# Patient Record
Sex: MALE | Race: WHITE | NOT HISPANIC OR LATINO
[De-identification: names, ages, dates, MRNs, and addresses within clinical notes are randomized per-mention and may not be internally consistent; named-entity substitution may affect disease eponyms.]

---

## 2018-12-26 ENCOUNTER — RECORD ABSTRACTING (OUTPATIENT)
Age: 53
End: 2018-12-26

## 2018-12-26 DIAGNOSIS — Z82.49 FAMILY HISTORY OF ISCHEMIC HEART DISEASE AND OTHER DISEASES OF THE CIRCULATORY SYSTEM: ICD-10-CM

## 2018-12-26 DIAGNOSIS — Z83.49 FAMILY HISTORY OF OTHER ENDOCRINE, NUTRITIONAL AND METABOLIC DISEASES: ICD-10-CM

## 2018-12-26 DIAGNOSIS — Z78.9 OTHER SPECIFIED HEALTH STATUS: ICD-10-CM

## 2019-01-04 ENCOUNTER — APPOINTMENT (OUTPATIENT)
Dept: INTERNAL MEDICINE | Facility: CLINIC | Age: 54
End: 2019-01-04
Payer: COMMERCIAL

## 2019-01-04 ENCOUNTER — NON-APPOINTMENT (OUTPATIENT)
Age: 54
End: 2019-01-04

## 2019-01-04 VITALS
HEIGHT: 73 IN | BODY MASS INDEX: 34.46 KG/M2 | WEIGHT: 260 LBS | SYSTOLIC BLOOD PRESSURE: 132 MMHG | DIASTOLIC BLOOD PRESSURE: 72 MMHG

## 2019-01-04 DIAGNOSIS — M15.3 SECONDARY MULTIPLE ARTHRITIS: ICD-10-CM

## 2019-01-04 DIAGNOSIS — E66.09 OTHER OBESITY DUE TO EXCESS CALORIES: ICD-10-CM

## 2019-01-04 LAB
APPEARANCE: CLEAR
BASOPHILS # BLD AUTO: 0.02 K/UL
BASOPHILS NFR BLD AUTO: 0.3 %
BILIRUBIN URINE: NEGATIVE
BLOOD URINE: NEGATIVE
COLOR: ABNORMAL
EOSINOPHIL # BLD AUTO: 0.09 K/UL
EOSINOPHIL NFR BLD AUTO: 1.4 %
GLUCOSE QUALITATIVE U: NEGATIVE MG/DL
HCT VFR BLD CALC: 46.8 %
HGB BLD-MCNC: 14.8 G/DL
IMM GRANULOCYTES NFR BLD AUTO: 0 %
KETONES URINE: NEGATIVE
LEUKOCYTE ESTERASE URINE: NEGATIVE
LYMPHOCYTES # BLD AUTO: 1.94 K/UL
LYMPHOCYTES NFR BLD AUTO: 30.4 %
MAN DIFF?: NORMAL
MCHC RBC-ENTMCNC: 29.4 PG
MCHC RBC-ENTMCNC: 31.6 GM/DL
MCV RBC AUTO: 93 FL
MONOCYTES # BLD AUTO: 0.59 K/UL
MONOCYTES NFR BLD AUTO: 9.2 %
NEUTROPHILS # BLD AUTO: 3.75 K/UL
NEUTROPHILS NFR BLD AUTO: 58.7 %
NITRITE URINE: NEGATIVE
PH URINE: 6.5
PLATELET # BLD AUTO: 180 K/UL
PROTEIN URINE: NEGATIVE MG/DL
RBC # BLD: 5.03 M/UL
RBC # FLD: 13.7 %
SPECIFIC GRAVITY URINE: 1.03
UROBILINOGEN URINE: NEGATIVE MG/DL
WBC # FLD AUTO: 6.39 K/UL

## 2019-01-04 PROCEDURE — 99396 PREV VISIT EST AGE 40-64: CPT | Mod: 25

## 2019-01-04 PROCEDURE — 93000 ELECTROCARDIOGRAM COMPLETE: CPT

## 2019-01-04 NOTE — HISTORY OF PRESENT ILLNESS
[FreeTextEntry8] : Patient is a 53-year-old male presenting for an annual exam. He's gained significant weight over the last few months is very concerned. He is very athletic, but has not been as active as he has been in the past. He says well being is otherwise good. He is eating well sleeping well. No night sweats. No chills. No fever. He has a number of orthopedic issues related to bilateral fractured wrists in the past for dislocated shoulder in the past and significant cervical discogenic disease. All of which are under relatively good control. Using Tylenol anti-inflammatories. He is more comfortable. Symptomatology was discussed in relationship to the necessity of surgery, particularly in relationship to his cervical disc.

## 2019-01-05 LAB
ALBUMIN SERPL ELPH-MCNC: 4.7 G/DL
ALP BLD-CCNC: 56 U/L
ALT SERPL-CCNC: 71 U/L
ANION GAP SERPL CALC-SCNC: 13 MMOL/L
AST SERPL-CCNC: 34 U/L
BILIRUB SERPL-MCNC: 0.7 MG/DL
BUN SERPL-MCNC: 19 MG/DL
CALCIUM SERPL-MCNC: 9.3 MG/DL
CHLORIDE SERPL-SCNC: 102 MMOL/L
CHOLEST SERPL-MCNC: 167 MG/DL
CHOLEST/HDLC SERPL: 3.3 RATIO
CO2 SERPL-SCNC: 24 MMOL/L
CREAT SERPL-MCNC: 0.91 MG/DL
GLUCOSE SERPL-MCNC: 104 MG/DL
HDLC SERPL-MCNC: 51 MG/DL
LDLC SERPL CALC-MCNC: 99 MG/DL
POTASSIUM SERPL-SCNC: 4.3 MMOL/L
PROT SERPL-MCNC: 7.3 G/DL
PSA FREE FLD-MCNC: 32 %
PSA FREE SERPL-MCNC: 0.36 NG/ML
PSA SERPL-MCNC: 1.11 NG/ML
SODIUM SERPL-SCNC: 139 MMOL/L
T4 FREE SERPL-MCNC: 1.2 NG/DL
TRIGL SERPL-MCNC: 87 MG/DL
TSH SERPL-ACNC: 2.25 UIU/ML

## 2019-01-11 ENCOUNTER — APPOINTMENT (OUTPATIENT)
Dept: UROLOGY | Facility: CLINIC | Age: 54
End: 2019-01-11

## 2019-02-11 ENCOUNTER — TRANSCRIPTION ENCOUNTER (OUTPATIENT)
Age: 54
End: 2019-02-11

## 2019-03-29 ENCOUNTER — APPOINTMENT (OUTPATIENT)
Dept: UROLOGY | Facility: CLINIC | Age: 54
End: 2019-03-29

## 2019-08-02 ENCOUNTER — APPOINTMENT (OUTPATIENT)
Dept: UROLOGY | Facility: CLINIC | Age: 54
End: 2019-08-02

## 2019-11-09 ENCOUNTER — TRANSCRIPTION ENCOUNTER (OUTPATIENT)
Age: 54
End: 2019-11-09

## 2020-12-21 ENCOUNTER — RESULT REVIEW (OUTPATIENT)
Age: 55
End: 2020-12-21

## 2020-12-21 ENCOUNTER — APPOINTMENT (OUTPATIENT)
Dept: FAMILY MEDICINE | Facility: CLINIC | Age: 55
End: 2020-12-21
Payer: COMMERCIAL

## 2020-12-21 ENCOUNTER — NON-APPOINTMENT (OUTPATIENT)
Age: 55
End: 2020-12-21

## 2020-12-21 VITALS
DIASTOLIC BLOOD PRESSURE: 82 MMHG | RESPIRATION RATE: 16 BRPM | HEART RATE: 80 BPM | SYSTOLIC BLOOD PRESSURE: 120 MMHG | OXYGEN SATURATION: 97 % | BODY MASS INDEX: 36.58 KG/M2 | HEIGHT: 73 IN | WEIGHT: 276 LBS | TEMPERATURE: 98.2 F

## 2020-12-21 PROCEDURE — 99072 ADDL SUPL MATRL&STAF TM PHE: CPT

## 2020-12-21 PROCEDURE — 90686 IIV4 VACC NO PRSV 0.5 ML IM: CPT

## 2020-12-21 PROCEDURE — 93000 ELECTROCARDIOGRAM COMPLETE: CPT

## 2020-12-21 PROCEDURE — 99215 OFFICE O/P EST HI 40 MIN: CPT | Mod: 25

## 2020-12-21 PROCEDURE — 36415 COLL VENOUS BLD VENIPUNCTURE: CPT

## 2020-12-21 PROCEDURE — G0008: CPT

## 2020-12-21 RX ORDER — IBUPROFEN 200 MG/1
200 TABLET, COATED ORAL 3 TIMES DAILY
Refills: 0 | Status: ACTIVE | COMMUNITY

## 2020-12-22 ENCOUNTER — NON-APPOINTMENT (OUTPATIENT)
Age: 55
End: 2020-12-22

## 2020-12-22 LAB
APTT BLD: 33.8 SEC
BASOPHILS # BLD AUTO: 0.03 K/UL
BASOPHILS NFR BLD AUTO: 0.6 %
EOSINOPHIL # BLD AUTO: 0.08 K/UL
EOSINOPHIL NFR BLD AUTO: 1.5 %
ESTIMATED AVERAGE GLUCOSE: 117 MG/DL
HBA1C MFR BLD HPLC: 5.7 %
HCT VFR BLD CALC: 46.9 %
HGB BLD-MCNC: 14.9 G/DL
IMM GRANULOCYTES NFR BLD AUTO: 0.2 %
INR PPP: 0.87 RATIO
LYMPHOCYTES # BLD AUTO: 1.79 K/UL
LYMPHOCYTES NFR BLD AUTO: 33.1 %
MAN DIFF?: NORMAL
MCHC RBC-ENTMCNC: 30.4 PG
MCHC RBC-ENTMCNC: 31.8 GM/DL
MCV RBC AUTO: 95.7 FL
MONOCYTES # BLD AUTO: 0.45 K/UL
MONOCYTES NFR BLD AUTO: 8.3 %
NEUTROPHILS # BLD AUTO: 3.05 K/UL
NEUTROPHILS NFR BLD AUTO: 56.3 %
PLATELET # BLD AUTO: 173 K/UL
PT BLD: 10.4 SEC
RBC # BLD: 4.9 M/UL
RBC # FLD: 12.9 %
WBC # FLD AUTO: 5.41 K/UL

## 2020-12-24 LAB
ALBUMIN SERPL ELPH-MCNC: 4.5 G/DL
ALP BLD-CCNC: 70 U/L
ALT SERPL-CCNC: 43 U/L
ANION GAP SERPL CALC-SCNC: 12 MMOL/L
AST SERPL-CCNC: 24 U/L
BILIRUB SERPL-MCNC: 0.5 MG/DL
BUN SERPL-MCNC: 21 MG/DL
CALCIUM SERPL-MCNC: 9.5 MG/DL
CHLORIDE SERPL-SCNC: 105 MMOL/L
CO2 SERPL-SCNC: 24 MMOL/L
CREAT SERPL-MCNC: 1.15 MG/DL
GLUCOSE SERPL-MCNC: 148 MG/DL
POTASSIUM SERPL-SCNC: 4.6 MMOL/L
PROT SERPL-MCNC: 7 G/DL
PSA FREE FLD-MCNC: 48 %
PSA FREE SERPL-MCNC: 0.28 NG/ML
PSA SERPL-MCNC: 0.57 NG/ML
SODIUM SERPL-SCNC: 141 MMOL/L

## 2020-12-24 NOTE — PHYSICAL EXAM
[Pedal Pulses Present] : the pedal pulses are present [No Edema] : there was no peripheral edema [No Extremity Clubbing/Cyanosis] : no extremity clubbing/cyanosis [Normal Anterior Cervical Nodes] : no anterior cervical lymphadenopathy [No CVA Tenderness] : no CVA  tenderness [No Joint Swelling] : no joint swelling [Grossly Normal Strength/Tone] : grossly normal strength/tone [Normal] : affect was normal and insight and judgment were intact [de-identified] : left knee: FROM intact

## 2020-12-24 NOTE — HISTORY OF PRESENT ILLNESS
[No Pertinent Cardiac History] : no history of aortic stenosis, atrial fibrillation, coronary artery disease, recent myocardial infarction, or implantable device/pacemaker [No Pertinent Pulmonary History] : no history of asthma, COPD, sleep apnea, or smoking [No Adverse Anesthesia Reaction] : no adverse anesthesia reaction in self or family member [Excellent (>10 METs)] : Excellent (>10 METs) [FreeTextEntry1] : left knee arthroscopy [FreeTextEntry2] : 12/29/20 [FreeTextEntry3] : Dr. Wilkerson [FreeTextEntry4] : 54 yo M here for pre-operative exam prior to knee surgery. Patient is due to have a left knee arthroscopy given his hx of a partially torn left lateral and medial meniscus. Patient has a hx of multiple knee surgeries with bilateral ACL repairs all due to hx of motorcross sport. He currently is working in a warehouse and is able to climb multiple flights of stairs and lift  boxes and also participates in [employer] once a week as a sport. He denies any hx of anesthesia complications or bleeding complications. Patient reports he was under general anesthesia for last knee scope. Patient does not smoke cigarerttes. Patient additionally requesting PSA and Hgb A1c testing as well as the flu shot today. Will follow with Dr. Holcomb for colonoscopy and routine health maintenance.  [FreeTextEntry8] : Patient performs vigorous motorcross sport activities

## 2021-04-23 ENCOUNTER — APPOINTMENT (OUTPATIENT)
Dept: FAMILY MEDICINE | Facility: CLINIC | Age: 56
End: 2021-04-23
Payer: COMMERCIAL

## 2021-04-23 VITALS
DIASTOLIC BLOOD PRESSURE: 88 MMHG | HEART RATE: 88 BPM | RESPIRATION RATE: 16 BRPM | TEMPERATURE: 98 F | BODY MASS INDEX: 35.78 KG/M2 | OXYGEN SATURATION: 99 % | SYSTOLIC BLOOD PRESSURE: 128 MMHG | WEIGHT: 270 LBS | HEIGHT: 73 IN

## 2021-04-23 DIAGNOSIS — S39.012A STRAIN OF MUSCLE, FASCIA AND TENDON OF LOWER BACK, INITIAL ENCOUNTER: ICD-10-CM

## 2021-04-23 DIAGNOSIS — Z01.818 ENCOUNTER FOR OTHER PREPROCEDURAL EXAMINATION: ICD-10-CM

## 2021-04-23 DIAGNOSIS — Z23 ENCOUNTER FOR IMMUNIZATION: ICD-10-CM

## 2021-04-23 LAB — HBA1C MFR BLD HPLC: 5.7

## 2021-04-23 PROCEDURE — 99213 OFFICE O/P EST LOW 20 MIN: CPT | Mod: 25

## 2021-04-23 PROCEDURE — 83036 HEMOGLOBIN GLYCOSYLATED A1C: CPT | Mod: NC,QW

## 2021-04-23 PROCEDURE — 99072 ADDL SUPL MATRL&STAF TM PHE: CPT

## 2021-04-26 PROBLEM — S39.012A LOW BACK STRAIN: Status: ACTIVE | Noted: 2021-04-26

## 2021-04-26 RX ORDER — OXYCODONE AND ACETAMINOPHEN 5; 325 MG/1; MG/1
5-325 TABLET ORAL
Qty: 24 | Refills: 0 | Status: COMPLETED | COMMUNITY
Start: 2020-12-29

## 2021-04-26 NOTE — PHYSICAL EXAM
[EOMI] : extraocular movements intact [No CVA Tenderness] : no CVA  tenderness [Coordination Grossly Intact] : coordination grossly intact [Normal Gait] : normal gait [Deep Tendon Reflexes (DTR)] : deep tendon reflexes were 2+ and symmetric [Normal] : affect was normal and insight and judgment were intact [de-identified] : mild parapsinal tenderness, right sided, no spasm present, no spinal point tenderness, negative straight leg raise bilaterally

## 2021-04-26 NOTE — PLAN
[FreeTextEntry1] : Patient wanted to follow up and monitor stable prediabetes; Hgb A1c reflective of stable prediabetes.

## 2021-04-26 NOTE — HISTORY OF PRESENT ILLNESS
[FreeTextEntry8] : 55 yo M with hx of prediabetes presenting for acute visit for lower back pain x2 weeks. Patient reports no other acute complaints but has been recently worried since coworker had back pain and ended up with metastases from cancer. Patient is currently working in [employer] where he is often lifting heavy boxes by himself up to 100lbs. Patient does not wear a back brace. Denies any changes in urination, changes in bowel movements, saddle anesthesia, sciatica type pain, nausea/vomiting, recent weight loss, nightsweats or chills.

## 2022-07-21 ENCOUNTER — TRANSCRIPTION ENCOUNTER (OUTPATIENT)
Age: 57
End: 2022-07-21

## 2022-08-19 ENCOUNTER — APPOINTMENT (OUTPATIENT)
Dept: FAMILY MEDICINE | Facility: CLINIC | Age: 57
End: 2022-08-19

## 2022-08-19 VITALS
SYSTOLIC BLOOD PRESSURE: 124 MMHG | DIASTOLIC BLOOD PRESSURE: 80 MMHG | BODY MASS INDEX: 36.18 KG/M2 | HEIGHT: 73 IN | OXYGEN SATURATION: 97 % | RESPIRATION RATE: 16 BRPM | WEIGHT: 273 LBS | TEMPERATURE: 97.4 F | HEART RATE: 72 BPM

## 2022-08-19 DIAGNOSIS — A69.20 LYME DISEASE, UNSPECIFIED: ICD-10-CM

## 2022-08-19 DIAGNOSIS — Z23 ENCOUNTER FOR IMMUNIZATION: ICD-10-CM

## 2022-08-19 PROCEDURE — 99213 OFFICE O/P EST LOW 20 MIN: CPT | Mod: 25

## 2022-08-19 PROCEDURE — 90471 IMMUNIZATION ADMIN: CPT

## 2022-08-19 PROCEDURE — 90750 HZV VACC RECOMBINANT IM: CPT

## 2022-08-26 NOTE — ADDENDUM
[FreeTextEntry1] : Patient received first shingles (Shingrix) vaccination during physician visit as ordered by Dr. Tejada.\par Prior to administration, reviewed shingles VIS with patient who verbalized understanding and consent.\par Patient denied severe allergic reaction to prior vaccinations and denied experiencing current episode of shingles.\par \par Patient tolerated vaccination well in right deltoid. No immediate adverse reaction noted.\par Patient provided shingles VIS for home review as per protocol. Patient stated that he will be scheduling annual physical with Dr. Tejada in 10 weeks and will received second shingles vaccination at physical.\par \par Leeann Sanchez RN

## 2022-08-26 NOTE — HISTORY OF PRESENT ILLNESS
[FreeTextEntry1] : ED follow up [de-identified] : Patient is a 56 yo M here for a four week follow up visit from ED visit for rash/Lyme disease. Tested positive in ED, completed treatment. feeling well. Also needs shingles shot.

## 2022-11-04 ENCOUNTER — APPOINTMENT (OUTPATIENT)
Dept: FAMILY MEDICINE | Facility: CLINIC | Age: 57
End: 2022-11-04

## 2022-11-04 VITALS
BODY MASS INDEX: 36.58 KG/M2 | DIASTOLIC BLOOD PRESSURE: 86 MMHG | WEIGHT: 276 LBS | HEIGHT: 73 IN | SYSTOLIC BLOOD PRESSURE: 136 MMHG | OXYGEN SATURATION: 97 % | TEMPERATURE: 97.2 F | HEART RATE: 73 BPM

## 2022-11-04 DIAGNOSIS — R73.03 PREDIABETES.: ICD-10-CM

## 2022-11-04 DIAGNOSIS — Z00.00 ENCOUNTER FOR GENERAL ADULT MEDICAL EXAMINATION W/OUT ABNORMAL FINDINGS: ICD-10-CM

## 2022-11-04 DIAGNOSIS — M50.90 CERVICAL DISC DISORDER, UNSPECIFIED, UNSPECIFIED CERVICAL REGION: ICD-10-CM

## 2022-11-04 DIAGNOSIS — Z23 ENCOUNTER FOR IMMUNIZATION: ICD-10-CM

## 2022-11-04 PROCEDURE — 90471 IMMUNIZATION ADMIN: CPT

## 2022-11-04 PROCEDURE — G0444 DEPRESSION SCREEN ANNUAL: CPT | Mod: NC,59

## 2022-11-04 PROCEDURE — 36415 COLL VENOUS BLD VENIPUNCTURE: CPT

## 2022-11-04 PROCEDURE — 99396 PREV VISIT EST AGE 40-64: CPT | Mod: 25

## 2022-11-04 PROCEDURE — 90750 HZV VACC RECOMBINANT IM: CPT

## 2022-11-07 LAB
ALBUMIN SERPL ELPH-MCNC: 4.8 G/DL
ALP BLD-CCNC: 64 U/L
ALT SERPL-CCNC: 39 U/L
ANION GAP SERPL CALC-SCNC: 13 MMOL/L
APPEARANCE: CLEAR
AST SERPL-CCNC: 27 U/L
BACTERIA UR CULT: NORMAL
BACTERIA: ABNORMAL
BASOPHILS # BLD AUTO: 0.03 K/UL
BASOPHILS NFR BLD AUTO: 0.5 %
BILIRUB SERPL-MCNC: 0.6 MG/DL
BILIRUBIN URINE: NEGATIVE
BLOOD URINE: NEGATIVE
BUN SERPL-MCNC: 15 MG/DL
CALCIUM SERPL-MCNC: 9.4 MG/DL
CHLORIDE SERPL-SCNC: 101 MMOL/L
CHOLEST SERPL-MCNC: 144 MG/DL
CO2 SERPL-SCNC: 25 MMOL/L
COLOR: YELLOW
CREAT SERPL-MCNC: 0.93 MG/DL
EGFR: 95 ML/MIN/1.73M2
EOSINOPHIL # BLD AUTO: 0.08 K/UL
EOSINOPHIL NFR BLD AUTO: 1.4 %
ESTIMATED AVERAGE GLUCOSE: 120 MG/DL
GLUCOSE QUALITATIVE U: NEGATIVE
GLUCOSE SERPL-MCNC: 95 MG/DL
HBA1C MFR BLD HPLC: 5.8 %
HCT VFR BLD CALC: 47.2 %
HDLC SERPL-MCNC: 43 MG/DL
HGB BLD-MCNC: 14.8 G/DL
HYALINE CASTS: 0 /LPF
IMM GRANULOCYTES NFR BLD AUTO: 0.2 %
KETONES URINE: NEGATIVE
LDLC SERPL CALC-MCNC: 82 MG/DL
LEUKOCYTE ESTERASE URINE: NEGATIVE
LYMPHOCYTES # BLD AUTO: 1.85 K/UL
LYMPHOCYTES NFR BLD AUTO: 33.3 %
MAN DIFF?: NORMAL
MCHC RBC-ENTMCNC: 30 PG
MCHC RBC-ENTMCNC: 31.4 GM/DL
MCV RBC AUTO: 95.7 FL
MICROSCOPIC-UA: NORMAL
MONOCYTES # BLD AUTO: 0.5 K/UL
MONOCYTES NFR BLD AUTO: 9 %
NEUTROPHILS # BLD AUTO: 3.08 K/UL
NEUTROPHILS NFR BLD AUTO: 55.6 %
NITRITE URINE: NEGATIVE
NONHDLC SERPL-MCNC: 101 MG/DL
PH URINE: 6.5
PLATELET # BLD AUTO: 170 K/UL
POTASSIUM SERPL-SCNC: 4.4 MMOL/L
PROT SERPL-MCNC: 7.2 G/DL
PROTEIN URINE: NEGATIVE
PSA FREE FLD-MCNC: 50 %
PSA FREE SERPL-MCNC: 0.28 NG/ML
PSA SERPL-MCNC: 0.55 NG/ML
RBC # BLD: 4.93 M/UL
RBC # FLD: 13.4 %
RED BLOOD CELLS URINE: 4 /HPF
SODIUM SERPL-SCNC: 140 MMOL/L
SPECIFIC GRAVITY URINE: 1.02
SQUAMOUS EPITHELIAL CELLS: 0 /HPF
T4 FREE SERPL-MCNC: 1.2 NG/DL
TRIGL SERPL-MCNC: 95 MG/DL
TSH SERPL-ACNC: 1.68 UIU/ML
UROBILINOGEN URINE: NORMAL
WBC # FLD AUTO: 5.55 K/UL
WHITE BLOOD CELLS URINE: 0 /HPF

## 2022-11-11 PROBLEM — Z00.00 ENCOUNTER FOR PREVENTIVE HEALTH EXAMINATION: Status: ACTIVE | Noted: 2018-11-06

## 2022-11-11 PROBLEM — Z23 NEED FOR SHINGLES VACCINE: Status: ACTIVE | Noted: 2022-08-19

## 2022-11-11 PROBLEM — R73.03 PREDIABETES: Status: ACTIVE | Noted: 2022-11-04

## 2022-11-11 PROBLEM — M50.90 CERVICAL DISC DISEASE: Status: ACTIVE | Noted: 2019-01-04

## 2022-11-11 NOTE — HEALTH RISK ASSESSMENT
[Very Good] : ~his/her~  mood as very good [Never] : Never [Yes] : Yes [Monthly or less (1 pt)] : Monthly or less (1 point) [1 or 2 (0 pts)] : 1 or 2 (0 points) [Never (0 pts)] : Never (0 points) [No falls in past year] : Patient reported no falls in the past year [0] : 2) Feeling down, depressed, or hopeless: Not at all (0) [Patient declined mammogram] : Patient declined mammogram [Patient declined PAP Smear] : Patient declined PAP Smear [Patient declined bone density test] : Patient declined bone density test [Patient declined colonoscopy] : Patient declined colonoscopy [None] : None [With Family] : lives with family [] :  [# Of Children ___] : has [unfilled] children [Sexually Active] : sexually active [Feels Safe at Home] : Feels safe at home [Fully functional (bathing, dressing, toileting, transferring, walking, feeding)] : Fully functional (bathing, dressing, toileting, transferring, walking, feeding) [Fully functional (using the telephone, shopping, preparing meals, housekeeping, doing laundry, using] : Fully functional and needs no help or supervision to perform IADLs (using the telephone, shopping, preparing meals, housekeeping, doing laundry, using transportation, managing medications and managing finances) [Smoke Detector] : smoke detector [Carbon Monoxide Detector] : carbon monoxide detector [Seat Belt] :  uses seat belt [PHQ-2 Negative - No further assessment needed] : PHQ-2 Negative - No further assessment needed [[id number]] : 0 [Reports changes in hearing] : Reports no changes in hearing [Reports changes in vision] : Reports no changes in vision [Reports changes in dental health] : Reports no changes in dental health [ColonoscopyComments] : about 20 years ago, reports normal [de-identified] : wears glasses [de-identified] : recent dental work

## 2022-11-11 NOTE — ADDENDUM
[FreeTextEntry1] : Patient received second shingles (Shingrix) vaccination as ordered by Dr. Tejada.\par Prior to administration, reviewed shingles VIS with patient who verbalized understanding and consent.\par Patient denied severe allergic reaction to first shingles vaccinations or any other prior vaccinations. Patient also denied experiencing current episode of shingles.\par \par Patient tolerated vaccination well in right deltoid. No immediate adverse reaction noted.\par Patient provided shingles VIS for home review as per protocol.\par \par Leeann Sanchez RN\par \par

## 2022-11-13 ENCOUNTER — NON-APPOINTMENT (OUTPATIENT)
Age: 57
End: 2022-11-13

## 2022-12-01 ENCOUNTER — APPOINTMENT (OUTPATIENT)
Dept: GASTROENTEROLOGY | Facility: CLINIC | Age: 57
End: 2022-12-01

## 2022-12-01 VITALS
DIASTOLIC BLOOD PRESSURE: 80 MMHG | SYSTOLIC BLOOD PRESSURE: 140 MMHG | HEIGHT: 73 IN | HEART RATE: 75 BPM | WEIGHT: 275 LBS | BODY MASS INDEX: 36.45 KG/M2

## 2022-12-01 DIAGNOSIS — Z83.438 FAMILY HISTORY OF OTHER DISORDER OF LIPOPROTEIN METABOLISM AND OTHER LIPIDEMIA: ICD-10-CM

## 2022-12-01 DIAGNOSIS — K64.8 OTHER HEMORRHOIDS: ICD-10-CM

## 2022-12-01 DIAGNOSIS — Z82.0 FAMILY HISTORY OF EPILEPSY AND OTHER DISEASES OF THE NERVOUS SYSTEM: ICD-10-CM

## 2022-12-01 DIAGNOSIS — Z12.11 ENCOUNTER FOR SCREENING FOR MALIGNANT NEOPLASM OF COLON: ICD-10-CM

## 2022-12-01 PROCEDURE — 99204 OFFICE O/P NEW MOD 45 MIN: CPT

## 2022-12-01 NOTE — HISTORY OF PRESENT ILLNESS
[FreeTextEntry1] : \par This HPI reflects a summary and review of records : including previous and most recent  Labs, body imaging, consults and progress notes, operative and pathology reports, EKG reports, ED records, found in [facility], [facility],  [facility] and any additional records brought in by  the patient at the time of the visit.\par \par \par PCP: dr Tejada\par \par 58 yo M w h/o Obesity, Pre-M, Cervical/Lumbar Disc Dis, OA\par + H/O Snoring,  no DTF, BMI= 36 , neck=17,STOP-Bang=4/5,Mallaampatti=4\par Hemorrhoids\par \par 12/1/22    Today:  Feeling well, no c/o , CP, SOB/ DAWN, Cough, Wheeze, Palpitations, edema\par   Most recent labs and imaging reviewed w patient: cbc, cmet, tsh, LDL ,psa--wnl   PwpG7t--7.8\par \par 12/1/22   Today: \par \par * Abd pain-->no\par * Nausea--> no\par * Vomit--> no\par * Early satiety--> no\par * Belching--> no\par * Hiccups--> no\par * Regurgitation--> no\par * Acid Taste / Water Brash--> no\par * Ht burn--> no\par * Dysphagia--> no\par * Throat Clearing--> no\par * Hoarseness--> no\par * Post-Nasal Drip--> no\par * Congestion--> no\par * Globus--> no\par * Cough--> no\par * Wheeze / PC-> -no\par * BMs: # 4 qd\par * Constipation--> no\par * Diarrhea--> no\par * Bloating--> no\par * Strain on Defecation--> no\par * Incompl Evac--> no\par * Flatulence--> no\par * Gurgling--> no\par * Melena--> no\par * BPBPR-> -no\par * Anorexia--> no\par * Wt. Loss--> no\par \par

## 2022-12-01 NOTE — ASSESSMENT
[FreeTextEntry1] : \par \par \par \par \par \par 1. Hemorrhoids:  well - controlled.  No pain,  swelling,  itch,  bleeding\par * Discussed  the  potential complications of thrombosis,  pain,  infection,  swelling, itching,  bleeding --none recently\par Recommendations: \par * Moderate- High  Fiber Diet was reviewed and emphasized\par * 6  --  8 cups of decaffeinated fluid daily was emphasized \par * Sitz Bathes as needed ,  No:  Anusol HC  Suppos / Cream  IL BID -- was needed\par * No:  Tucks BID,  Balneol Lotion,   Calmoseptine Oint -- was needed ;    can use  Prep H prn\par * No:  need for  Colorectal surgical evaluation for possible ablation \par \par \par \par \par \par \par 2. Colorectal  Neoplasia  Screening:  to be evaluated\par   Utilizing teaching posters and anatomical models the following were discussed and emphasized with the patient in detail: \par * Discussed the pre-malignant potential of polyps\par * Discussed the importance of f/u surveillance / screening colonoscopy \par * moderate-High  Fiber Diet was reviewed and emphasized\par * Anti-oxidants and ASA/NSAID Therapy emphasized\par * Given age > 40-49 yo\par * Recommend Colonoscopy  to  R/O  Colonic Neoplasia-- in 2022\par \par \par \par \par \par Informed Consent:\par * The risks & Benefits of  Colonoscopy were discussed w patient.\par * This included but was not limited to perforation, bleeding, sedation /med rxns possibly requiring surgery, blood transfusions, antibiotics & CPR/Intubation.\par * Pt. understands & agrees to the procedures.\par The following instructions in regards to the prep and medically essential ( cardiac, pulmonary, sz, psych, endocrine)  pre-op medication administration\par was reviewed and emphasized with the patient . \par * Pt. advised to D/C  ASA/NSAIDs  7  Days  PTP.\par * [ +++ ]  Dulcolax / Miralax / Mag. Citrate ,  [     ] Prepopik/ Clenpiq ,  [     ] Osmo Prep,  [    ] GoLytely,  prep. reviewed w Pt.\par * Hold  [           ] AM of procedure.\par * Hold  [           ] PM  before procedure.\par * Take  [           ] PM  before procedure.\par * Take  [           ] AM of procedure.\par \par

## 2023-04-13 ENCOUNTER — RESULT REVIEW (OUTPATIENT)
Age: 58
End: 2023-04-13

## 2023-04-14 ENCOUNTER — APPOINTMENT (OUTPATIENT)
Dept: GASTROENTEROLOGY | Facility: HOSPITAL | Age: 58
End: 2023-04-14

## 2024-06-14 ENCOUNTER — APPOINTMENT (OUTPATIENT)
Dept: FAMILY MEDICINE | Facility: CLINIC | Age: 59
End: 2024-06-14
Payer: COMMERCIAL

## 2024-06-14 VITALS
BODY MASS INDEX: 35.12 KG/M2 | TEMPERATURE: 98.6 F | SYSTOLIC BLOOD PRESSURE: 110 MMHG | OXYGEN SATURATION: 97 % | WEIGHT: 265 LBS | HEIGHT: 73 IN | HEART RATE: 72 BPM | DIASTOLIC BLOOD PRESSURE: 66 MMHG

## 2024-06-14 DIAGNOSIS — M62.08 SEPARATION OF MUSCLE (NONTRAUMATIC), OTHER SITE: ICD-10-CM

## 2024-06-14 PROCEDURE — G2211 COMPLEX E/M VISIT ADD ON: CPT

## 2024-06-14 PROCEDURE — 99214 OFFICE O/P EST MOD 30 MIN: CPT

## 2024-06-14 NOTE — PLAN
[FreeTextEntry1] : 58 yo M with likely diastasis recti on examination - physical therapy discussed, reviewed physiology of diastasis abdominis vs ventral hernia - red flag signs and symptoms discussed for hernia, if needed, will obtain imaging if pain develops (ultrasound) - all questions answered - return for annual physical

## 2024-06-14 NOTE — HEALTH RISK ASSESSMENT
[Yes] : Yes [Monthly or less (1 pt)] : Monthly or less (1 point) [1 or 2 (0 pts)] : 1 or 2 (0 points) [Never (0 pts)] : Never (0 points) [No falls in past year] : Patient reported no falls in the past year [0] : 2) Feeling down, depressed, or hopeless: Not at all (0) [Never] : Never [[id number]] : 0

## 2024-06-14 NOTE — PHYSICAL EXAM
[Soft] : abdomen soft [Non Tender] : non-tender [Normal Bowel Sounds] : normal bowel sounds [Normal] : affect was normal and insight and judgment were intact [de-identified] : diastasis recti, normal umbilicus

## 2024-06-14 NOTE — HISTORY OF PRESENT ILLNESS
[FreeTextEntry8] : 58 yo M presenting for evaluation of abdominal bulging. Patient reports wife noticed it while he was doing some exercises on the ground. He does not do crunches or sit ups but does some exercises with weights while laying sometimes. He does not feel any pain or discomfort but notices bulge when he tries to get up from a laying position.

## 2024-08-02 ENCOUNTER — RESULT REVIEW (OUTPATIENT)
Age: 59
End: 2024-08-02

## 2024-08-02 ENCOUNTER — APPOINTMENT (OUTPATIENT)
Dept: FAMILY MEDICINE | Facility: CLINIC | Age: 59
End: 2024-08-02
Payer: COMMERCIAL

## 2024-08-02 VITALS
SYSTOLIC BLOOD PRESSURE: 120 MMHG | HEART RATE: 71 BPM | TEMPERATURE: 100.4 F | WEIGHT: 256 LBS | BODY MASS INDEX: 33.93 KG/M2 | HEIGHT: 73 IN | OXYGEN SATURATION: 96 % | DIASTOLIC BLOOD PRESSURE: 70 MMHG

## 2024-08-02 DIAGNOSIS — S22.49XA MULTIPLE FRACTURES OF RIBS, UNSPECIFIED SIDE, INITIAL ENCOUNTER FOR CLOSED FRACTURE: ICD-10-CM

## 2024-08-02 DIAGNOSIS — S27.0XXA TRAUMATIC PNEUMOTHORAX, INITIAL ENCOUNTER: ICD-10-CM

## 2024-08-02 DIAGNOSIS — Z09 ENCOUNTER FOR FOLLOW-UP EXAMINATION AFTER COMPLETED TREATMENT FOR CONDITIONS OTHER THAN MALIGNANT NEOPLASM: ICD-10-CM

## 2024-08-02 PROCEDURE — 99496 TRANSJ CARE MGMT HIGH F2F 7D: CPT

## 2024-08-02 PROCEDURE — G2211 COMPLEX E/M VISIT ADD ON: CPT | Mod: NC

## 2024-08-04 PROBLEM — S27.0XXA TRAUMATIC PNEUMOTHORAX: Status: ACTIVE | Noted: 2024-08-02

## 2024-08-04 PROBLEM — Z09 HOSPITAL DISCHARGE FOLLOW-UP: Status: ACTIVE | Noted: 2024-08-04

## 2024-08-04 PROBLEM — S22.49XA RIB FRACTURES: Status: ACTIVE | Noted: 2024-08-02

## 2024-08-04 NOTE — PHYSICAL EXAM
[Normal] : affect was normal and insight and judgment were intact [37291 - High Complexity requires an extensive number of possible diagnoses and/or the management options, extensive complexity of the medical data (tests, etc.) to be reviewed, and a high risk of significant complications, morbidity, and/or mortality as w] : High Complexity
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
## 2024-08-04 NOTE — PHYSICAL EXAM
[Normal] : affect was normal and insight and judgment were intact [34165 - High Complexity requires an extensive number of possible diagnoses and/or the management options, extensive complexity of the medical data (tests, etc.) to be reviewed, and a high risk of significant complications, morbidity, and/or mortality as w] : High Complexity
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
## 2024-08-23 ENCOUNTER — LABORATORY RESULT (OUTPATIENT)
Age: 59
End: 2024-08-23

## 2024-08-23 ENCOUNTER — APPOINTMENT (OUTPATIENT)
Dept: FAMILY MEDICINE | Facility: CLINIC | Age: 59
End: 2024-08-23
Payer: COMMERCIAL

## 2024-08-23 VITALS
HEIGHT: 73 IN | WEIGHT: 255 LBS | SYSTOLIC BLOOD PRESSURE: 124 MMHG | HEART RATE: 77 BPM | BODY MASS INDEX: 33.8 KG/M2 | DIASTOLIC BLOOD PRESSURE: 82 MMHG | OXYGEN SATURATION: 98 %

## 2024-08-23 DIAGNOSIS — R73.03 PREDIABETES.: ICD-10-CM

## 2024-08-23 DIAGNOSIS — Z00.00 ENCOUNTER FOR GENERAL ADULT MEDICAL EXAMINATION W/OUT ABNORMAL FINDINGS: ICD-10-CM

## 2024-08-23 DIAGNOSIS — K63.5 POLYP OF COLON: ICD-10-CM

## 2024-08-23 DIAGNOSIS — S22.49XA MULTIPLE FRACTURES OF RIBS, UNSPECIFIED SIDE, INITIAL ENCOUNTER FOR CLOSED FRACTURE: ICD-10-CM

## 2024-08-23 DIAGNOSIS — M50.90 CERVICAL DISC DISORDER, UNSPECIFIED, UNSPECIFIED CERVICAL REGION: ICD-10-CM

## 2024-08-23 DIAGNOSIS — Z12.5 ENCOUNTER FOR SCREENING FOR MALIGNANT NEOPLASM OF PROSTATE: ICD-10-CM

## 2024-08-23 DIAGNOSIS — S27.0XXA TRAUMATIC PNEUMOTHORAX, INITIAL ENCOUNTER: ICD-10-CM

## 2024-08-23 DIAGNOSIS — E66.9 OBESITY, UNSPECIFIED: ICD-10-CM

## 2024-08-23 PROCEDURE — 99396 PREV VISIT EST AGE 40-64: CPT

## 2024-08-23 PROCEDURE — 36415 COLL VENOUS BLD VENIPUNCTURE: CPT

## 2024-08-23 NOTE — HEALTH RISK ASSESSMENT
[Good] : ~his/her~  mood as  good [No] : No [1 or 2 (0 pts)] : 1 or 2 (0 points) [Never (0 pts)] : Never (0 points) [No falls in past year] : Patient reported no falls in the past year [0] : 2) Feeling down, depressed, or hopeless: Not at all (0) [Never] : Never [0-4] : 0-4 [None] : None [With Significant Other] : lives with significant other [] :  [Fully functional (bathing, dressing, toileting, transferring, walking, feeding)] : Fully functional (bathing, dressing, toileting, transferring, walking, feeding) [Fully functional (using the telephone, shopping, preparing meals, housekeeping, doing laundry, using] : Fully functional and needs no help or supervision to perform IADLs (using the telephone, shopping, preparing meals, housekeeping, doing laundry, using transportation, managing medications and managing finances) [PHQ-2 Negative - No further assessment needed] : PHQ-2 Negative - No further assessment needed [Audit-CScore] : 0 [de-identified] : walking [de-identified] : monitored, healthy, decreased sugars [Patient reported colonoscopy was abnormal] : Patient reported colonoscopy was abnormal [Sexually Active] : sexually active [High Risk Behavior] : no high risk behavior [Feels Safe at Home] : Feels safe at home [Reports changes in hearing] : Reports no changes in hearing [Reports changes in vision] : Reports no changes in vision [Reports normal functional visual acuity (ie: able to read med bottle)] : Reports normal functional visual acuity [ColonoscopyDate] : 2023 [ColonoscopyComments] : colon polyps; repeat 5 years
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
## 2024-08-26 LAB
ALBUMIN SERPL ELPH-MCNC: 4.7 G/DL
ALP BLD-CCNC: 88 U/L
ALT SERPL-CCNC: 33 U/L
ANION GAP SERPL CALC-SCNC: 16 MMOL/L
APPEARANCE: CLEAR
AST SERPL-CCNC: 23 U/L
BILIRUB SERPL-MCNC: 0.9 MG/DL
BILIRUBIN URINE: ABNORMAL
BLOOD URINE: NEGATIVE
BUN SERPL-MCNC: 17 MG/DL
CALCIUM SERPL-MCNC: 9.6 MG/DL
CHLORIDE SERPL-SCNC: 101 MMOL/L
CHOLEST SERPL-MCNC: 149 MG/DL
CO2 SERPL-SCNC: 22 MMOL/L
COLOR: NORMAL
CREAT SERPL-MCNC: 0.93 MG/DL
EGFR: 93 ML/MIN/1.73M2
GLUCOSE QUALITATIVE U: NEGATIVE MG/DL
GLUCOSE SERPL-MCNC: 105 MG/DL
HDLC SERPL-MCNC: 49 MG/DL
KETONES URINE: NEGATIVE MG/DL
LDLC SERPL CALC-MCNC: 85 MG/DL
LEUKOCYTE ESTERASE URINE: ABNORMAL
NITRITE URINE: NEGATIVE
NONHDLC SERPL-MCNC: 100 MG/DL
PH URINE: 6.5
POTASSIUM SERPL-SCNC: 4.3 MMOL/L
PROT SERPL-MCNC: 7.7 G/DL
PROTEIN URINE: NORMAL MG/DL
PSA FREE FLD-MCNC: 49 %
PSA FREE SERPL-MCNC: 0.3 NG/ML
PSA SERPL-MCNC: 0.61 NG/ML
SODIUM SERPL-SCNC: 139 MMOL/L
SPECIFIC GRAVITY URINE: 1.03
TRIGL SERPL-MCNC: 75 MG/DL
TSH SERPL-ACNC: 1.61 UIU/ML
UROBILINOGEN URINE: 1 MG/DL

## 2024-09-06 ENCOUNTER — APPOINTMENT (OUTPATIENT)
Dept: INTERNAL MEDICINE | Facility: CLINIC | Age: 59
End: 2024-09-06
Payer: COMMERCIAL

## 2024-09-06 DIAGNOSIS — E66.9 OBESITY, UNSPECIFIED: ICD-10-CM

## 2024-09-06 PROCEDURE — 36415 COLL VENOUS BLD VENIPUNCTURE: CPT

## 2024-09-09 LAB
BASOPHILS # BLD AUTO: 0.02 K/UL
BASOPHILS NFR BLD AUTO: 0.4 %
EOSINOPHIL # BLD AUTO: 0.11 K/UL
EOSINOPHIL NFR BLD AUTO: 2.1 %
ESTIMATED AVERAGE GLUCOSE: 114 MG/DL
HBA1C MFR BLD HPLC: 5.6 %
HCT VFR BLD CALC: 45.9 %
HGB BLD-MCNC: 14.6 G/DL
IMM GRANULOCYTES NFR BLD AUTO: 0.2 %
LYMPHOCYTES # BLD AUTO: 1.68 K/UL
LYMPHOCYTES NFR BLD AUTO: 31.5 %
MAN DIFF?: NORMAL
MCHC RBC-ENTMCNC: 30.1 PG
MCHC RBC-ENTMCNC: 31.8 GM/DL
MCV RBC AUTO: 94.6 FL
MONOCYTES # BLD AUTO: 0.56 K/UL
MONOCYTES NFR BLD AUTO: 10.5 %
NEUTROPHILS # BLD AUTO: 2.95 K/UL
NEUTROPHILS NFR BLD AUTO: 55.3 %
PLATELET # BLD AUTO: 152 K/UL
RBC # BLD: 4.85 M/UL
RBC # FLD: 13.6 %
WBC # FLD AUTO: 5.33 K/UL